# Patient Record
Sex: MALE | Race: WHITE | Employment: FULL TIME | URBAN - NONMETROPOLITAN AREA
[De-identification: names, ages, dates, MRNs, and addresses within clinical notes are randomized per-mention and may not be internally consistent; named-entity substitution may affect disease eponyms.]

---

## 2017-03-08 ENCOUNTER — OFFICE VISIT (OUTPATIENT)
Dept: CARDIOLOGY | Age: 62
End: 2017-03-08

## 2017-03-08 ENCOUNTER — PROCEDURE VISIT (OUTPATIENT)
Dept: CARDIOLOGY | Age: 62
End: 2017-03-08

## 2017-03-08 VITALS
SYSTOLIC BLOOD PRESSURE: 106 MMHG | WEIGHT: 184 LBS | DIASTOLIC BLOOD PRESSURE: 70 MMHG | HEART RATE: 72 BPM | BODY MASS INDEX: 28.88 KG/M2 | HEIGHT: 67 IN

## 2017-03-08 DIAGNOSIS — Z95.0 PACEMAKER: ICD-10-CM

## 2017-03-08 DIAGNOSIS — T82.110D PACEMAKER LEAD MALFUNCTION, SUBSEQUENT ENCOUNTER: ICD-10-CM

## 2017-03-08 DIAGNOSIS — Z95.0 PACEMAKER: Primary | ICD-10-CM

## 2017-03-08 DIAGNOSIS — Z45.010 PACEMAKER BATTERY DEPLETION: ICD-10-CM

## 2017-03-08 DIAGNOSIS — E78.5 DYSLIPIDEMIA: ICD-10-CM

## 2017-03-08 DIAGNOSIS — I10 ESSENTIAL HYPERTENSION: ICD-10-CM

## 2017-03-08 DIAGNOSIS — Z71.6 TOBACCO ABUSE COUNSELING: Primary | Chronic | ICD-10-CM

## 2017-03-08 DIAGNOSIS — T82.110D PACEMAKER LEAD FRACTURE, SUBSEQUENT ENCOUNTER: Chronic | ICD-10-CM

## 2017-03-08 DIAGNOSIS — Z72.0 TOBACCO ABUSE: ICD-10-CM

## 2017-03-08 PROCEDURE — 99213 OFFICE O/P EST LOW 20 MIN: CPT | Performed by: INTERNAL MEDICINE

## 2017-03-08 PROCEDURE — 93280 PM DEVICE PROGR EVAL DUAL: CPT | Performed by: INTERNAL MEDICINE

## 2017-03-08 ASSESSMENT — ENCOUNTER SYMPTOMS
RESPIRATORY NEGATIVE: 1
GASTROINTESTINAL NEGATIVE: 1
EYES NEGATIVE: 1

## 2017-06-15 ENCOUNTER — TELEPHONE (OUTPATIENT)
Dept: CARDIOLOGY | Age: 62
End: 2017-06-15

## 2017-06-16 ENCOUNTER — PROCEDURE VISIT (OUTPATIENT)
Dept: CARDIOLOGY | Age: 62
End: 2017-06-16

## 2017-06-16 DIAGNOSIS — Z95.0 PACEMAKER: Primary | ICD-10-CM

## 2017-06-16 PROCEDURE — 93296 REM INTERROG EVL PM/IDS: CPT | Performed by: INTERNAL MEDICINE

## 2017-06-16 PROCEDURE — 93294 REM INTERROG EVL PM/LDLS PM: CPT | Performed by: INTERNAL MEDICINE

## 2017-09-21 ENCOUNTER — TELEPHONE (OUTPATIENT)
Dept: CARDIOLOGY CLINIC | Age: 62
End: 2017-09-21

## 2017-09-27 ENCOUNTER — PROCEDURE VISIT (OUTPATIENT)
Dept: CARDIOLOGY CLINIC | Age: 62
End: 2017-09-27
Payer: COMMERCIAL

## 2017-09-27 DIAGNOSIS — Z95.0 PACEMAKER: Primary | ICD-10-CM

## 2017-09-27 PROCEDURE — 93294 REM INTERROG EVL PM/LDLS PM: CPT | Performed by: INTERNAL MEDICINE

## 2017-09-27 PROCEDURE — 93296 REM INTERROG EVL PM/IDS: CPT | Performed by: INTERNAL MEDICINE

## 2017-11-01 ENCOUNTER — OFFICE VISIT (OUTPATIENT)
Dept: CARDIOLOGY CLINIC | Age: 62
End: 2017-11-01
Payer: COMMERCIAL

## 2017-11-01 VITALS
DIASTOLIC BLOOD PRESSURE: 62 MMHG | WEIGHT: 187 LBS | SYSTOLIC BLOOD PRESSURE: 136 MMHG | HEART RATE: 70 BPM | HEIGHT: 69 IN | BODY MASS INDEX: 27.7 KG/M2

## 2017-11-01 DIAGNOSIS — E78.5 DYSLIPIDEMIA: ICD-10-CM

## 2017-11-01 DIAGNOSIS — Z45.010 PACEMAKER BATTERY DEPLETION: ICD-10-CM

## 2017-11-01 DIAGNOSIS — Z72.0 TOBACCO ABUSE: ICD-10-CM

## 2017-11-01 DIAGNOSIS — Z71.6 TOBACCO ABUSE COUNSELING: Chronic | ICD-10-CM

## 2017-11-01 DIAGNOSIS — Z95.0 PACEMAKER: ICD-10-CM

## 2017-11-01 DIAGNOSIS — Z82.49 FAMILY HISTORY OF EARLY CAD: Primary | ICD-10-CM

## 2017-11-01 PROCEDURE — 99213 OFFICE O/P EST LOW 20 MIN: CPT | Performed by: INTERNAL MEDICINE

## 2017-11-01 PROCEDURE — 93000 ELECTROCARDIOGRAM COMPLETE: CPT | Performed by: INTERNAL MEDICINE

## 2017-11-01 ASSESSMENT — ENCOUNTER SYMPTOMS
EYES NEGATIVE: 1
RESPIRATORY NEGATIVE: 1
GASTROINTESTINAL NEGATIVE: 1

## 2017-11-01 NOTE — PROGRESS NOTES
time. He appears well-developed and well-nourished. No distress. HENT:   Head: Normocephalic and atraumatic. Mouth/Throat: Oropharynx is clear and moist.   Eyes: Conjunctivae and EOM are normal. Pupils are equal, round, and reactive to light. No scleral icterus. Neck: Normal range of motion. Neck supple. No JVD present. No thyromegaly present. Cardiovascular: Normal rate, regular rhythm and normal heart sounds. Pulses:       Radial pulses are 2+ on the right side, and 2+ on the left side. Femoral pulses are 2+ on the right side, and 2+ on the left side. Popliteal pulses are 2+ on the right side, and 2+ on the left side. Dorsalis pedis pulses are 2+ on the right side, and 2+ on the left side. Posterior tibial pulses are 2+ on the right side, and 2+ on the left side. Pulmonary/Chest: Effort normal and breath sounds normal.   Abdominal: Soft. Bowel sounds are normal. He exhibits no mass. There is no tenderness. Musculoskeletal: He exhibits no edema. Lymphadenopathy:     He has no cervical adenopathy. Neurological: He is alert and oriented to person, place, and time. He has normal reflexes. No cranial nerve deficit. Skin: Skin is warm. No rash noted. Psychiatric: He has a normal mood and affect.        /62   Pulse 70   Ht 5' 9\" (1.753 m)   Wt 187 lb (84.8 kg)   BMI 27.62 kg/m²   Wt Readings from Last 3 Encounters:   11/01/17 187 lb (84.8 kg)   03/08/17 184 lb (83.5 kg)   07/06/16 181 lb (82.1 kg)     BP Readings from Last 3 Encounters:   11/01/17 136/62   03/08/17 106/70   07/06/16 118/84       Lab Data  CBC:   Lab Results   Component Value Date    WBC 6.9 11/18/2014    RBC 5.00 11/18/2014    RBC 5.18 03/15/2012    HGB 15.6 11/18/2014    HGB 15.0 09/27/2013    HCT 46.9 11/18/2014    MCV 93.8 11/18/2014    MCH 31.3 11/18/2014    MCHC 33.4 11/18/2014    RDW 13.3 11/18/2014     11/18/2014     09/27/2013     03/15/2012    MPV 10.3 11/18/2014 CMP:    Lab Results   Component Value Date     11/18/2014    K 4.9 11/18/2014     11/18/2014    CO2 24 11/18/2014    BUN 15 11/18/2014    CREATININE 0.7 11/18/2014    CREATININE 0.9 09/27/2013    CREATININE 0.9 03/15/2012    LABGLOM >90 11/18/2014    GLUCOSE 93 11/26/2014    GLUCOSE 99 03/15/2012    PROT 7.5 11/26/2014    LABALBU 4.6 11/26/2014    LABALBU 4.4 09/15/2011    CALCIUM 9.6 11/18/2014    BILITOT 0.3 11/26/2014    ALKPHOS 97 11/26/2014    AST 29 11/26/2014    ALT 41 11/26/2014     Hepatic Function Panel:    Lab Results   Component Value Date    ALKPHOS 97 11/26/2014    ALT 41 11/26/2014    AST 29 11/26/2014    PROT 7.5 11/26/2014    BILITOT 0.3 11/26/2014    BILIDIR <0.2 11/26/2014    LABALBU 4.6 11/26/2014    LABALBU 4.4 09/15/2011     Magnesium:    Lab Results   Component Value Date    MG 2.2 09/27/2013     PT/INR:  No results found for: PROTIME, INR  HgBA1c:  No results found for: LABA1C  FLP:    Lab Results   Component Value Date    TRIG 220 11/26/2014    TRIG 175 09/27/2013    TRIG 256 03/15/2012    HDL 41 11/26/2014    HDL 39 09/27/2013    HDL 39 03/15/2012    LDLCALC 120 11/26/2014    LDLCALC 115 09/27/2013    LDLCALC 131 03/15/2012     TSH:  No results found for: TSH  No results for input(s): CKTOTAL, CKMB, CKMBINDEX, TROPONINI in the last 72 hours. Assessment:     Mr. Loc Cordero is a 63 y.o. who is known to us with history of SSS, S/P pacemaker implantation. He is a smoker and has FH of CAD. He had his pulse generator replaced and had an new atrial lead inserted keeping the fractured atrial lead in place. This was done in December 2014. Patient is here for follow up on paroxysmal atrial fibrillation. has been doing fine with no reported no symptoms. Unfortunately continues to smoke. The following diagnoses were addressed during this visit:  1. Family history of early CAD  EKG 12 Lead   2. St Victorino dual pacemaker  EKG 12 Lead   3. [employer]. Victorino dual pacemaker     4.  Dyslipidemia

## 2017-11-01 NOTE — PROGRESS NOTES
Patient here for 6 month follow up. EKG done today. Patient denies SOB. Chest pain. Palpitations. Dizziness/lightheaded. Fatigue. FOSTER.

## 2017-11-29 ENCOUNTER — HOSPITAL ENCOUNTER (OUTPATIENT)
Age: 62
Discharge: HOME | End: 2017-11-29
Payer: COMMERCIAL

## 2017-11-29 DIAGNOSIS — E78.5 DYSLIPIDEMIA: ICD-10-CM

## 2017-11-29 LAB
CHOLESTEROL, TOTAL: 234 MG/DL (ref 100–199)
HDLC SERPL-MCNC: 38 MG/DL
LDL CHOLESTEROL CALCULATED: 116 MG/DL
TRIGL SERPL-MCNC: 401 MG/DL (ref 0–199)

## 2017-11-29 PROCEDURE — 80061 LIPID PANEL: CPT

## 2017-11-29 PROCEDURE — 36415 COLL VENOUS BLD VENIPUNCTURE: CPT

## 2018-01-04 ENCOUNTER — PROCEDURE VISIT (OUTPATIENT)
Dept: CARDIOLOGY CLINIC | Age: 63
End: 2018-01-04
Payer: COMMERCIAL

## 2018-01-04 DIAGNOSIS — Z95.0 PACEMAKER: Primary | ICD-10-CM

## 2018-01-04 PROCEDURE — 93294 REM INTERROG EVL PM/LDLS PM: CPT | Performed by: INTERNAL MEDICINE

## 2018-01-04 PROCEDURE — 93296 REM INTERROG EVL PM/IDS: CPT | Performed by: INTERNAL MEDICINE

## 2018-03-29 ENCOUNTER — NURSE ONLY (OUTPATIENT)
Dept: CARDIOLOGY CLINIC | Age: 63
End: 2018-03-29
Payer: COMMERCIAL

## 2018-03-29 DIAGNOSIS — Z95.0 PACEMAKER: Primary | ICD-10-CM

## 2018-03-29 PROCEDURE — 93280 PM DEVICE PROGR EVAL DUAL: CPT | Performed by: INTERNAL MEDICINE

## 2018-04-25 ENCOUNTER — OFFICE VISIT (OUTPATIENT)
Dept: CARDIOLOGY CLINIC | Age: 63
End: 2018-04-25
Payer: COMMERCIAL

## 2018-04-25 VITALS
BODY MASS INDEX: 27.13 KG/M2 | HEIGHT: 69 IN | HEART RATE: 76 BPM | SYSTOLIC BLOOD PRESSURE: 126 MMHG | WEIGHT: 183.2 LBS | DIASTOLIC BLOOD PRESSURE: 86 MMHG

## 2018-04-25 DIAGNOSIS — E78.5 DYSLIPIDEMIA: Primary | ICD-10-CM

## 2018-04-25 DIAGNOSIS — Z95.0 PACEMAKER: ICD-10-CM

## 2018-04-25 DIAGNOSIS — I10 ESSENTIAL HYPERTENSION: ICD-10-CM

## 2018-04-25 DIAGNOSIS — Z72.0 TOBACCO ABUSE: ICD-10-CM

## 2018-04-25 PROCEDURE — 99213 OFFICE O/P EST LOW 20 MIN: CPT | Performed by: PHYSICIAN ASSISTANT

## 2018-06-28 ENCOUNTER — PROCEDURE VISIT (OUTPATIENT)
Dept: CARDIOLOGY CLINIC | Age: 63
End: 2018-06-28
Payer: COMMERCIAL

## 2018-06-28 DIAGNOSIS — Z95.0 PACEMAKER: Primary | ICD-10-CM

## 2018-06-28 PROCEDURE — 93294 REM INTERROG EVL PM/LDLS PM: CPT | Performed by: INTERNAL MEDICINE

## 2018-06-28 PROCEDURE — 93296 REM INTERROG EVL PM/IDS: CPT | Performed by: INTERNAL MEDICINE

## 2018-10-05 ENCOUNTER — PROCEDURE VISIT (OUTPATIENT)
Dept: CARDIOLOGY CLINIC | Age: 63
End: 2018-10-05
Payer: COMMERCIAL

## 2018-10-05 DIAGNOSIS — Z95.0 PACEMAKER: Primary | ICD-10-CM

## 2018-10-05 PROCEDURE — 93296 REM INTERROG EVL PM/IDS: CPT | Performed by: INTERNAL MEDICINE

## 2018-10-05 PROCEDURE — 93294 REM INTERROG EVL PM/LDLS PM: CPT | Performed by: INTERNAL MEDICINE

## 2018-10-05 NOTE — PROGRESS NOTES
DR CRUZ PT  ST RIVAS DUAL PACEMAKER REMOTE  BATTERY 11.2-12.5 YRS REMAINING  ATRIAL IMPEDENCE 360  VENT IMPEDENCE 650  P WAVES 3.8  RV WAVES >12  DDD   A PACED 16%  V PACED <1%

## 2018-12-11 ENCOUNTER — OFFICE VISIT (OUTPATIENT)
Dept: CARDIOLOGY CLINIC | Age: 63
End: 2018-12-11
Payer: COMMERCIAL

## 2018-12-11 VITALS
WEIGHT: 183.8 LBS | HEIGHT: 70 IN | HEART RATE: 68 BPM | BODY MASS INDEX: 26.31 KG/M2 | SYSTOLIC BLOOD PRESSURE: 144 MMHG | DIASTOLIC BLOOD PRESSURE: 82 MMHG

## 2018-12-11 DIAGNOSIS — I10 ESSENTIAL HYPERTENSION: Primary | ICD-10-CM

## 2018-12-11 DIAGNOSIS — I49.9 IRREGULAR HEART BEAT: ICD-10-CM

## 2018-12-11 DIAGNOSIS — E78.5 DYSLIPIDEMIA: ICD-10-CM

## 2018-12-11 PROCEDURE — 93000 ELECTROCARDIOGRAM COMPLETE: CPT | Performed by: INTERNAL MEDICINE

## 2018-12-11 PROCEDURE — 99213 OFFICE O/P EST LOW 20 MIN: CPT | Performed by: INTERNAL MEDICINE

## 2018-12-11 NOTE — PROGRESS NOTES
All patientquestions answered. Pt voiced understanding. Patient agreed with treatment plan.                Electronically signed by Brennan Jaime MD FACAWILDA,JESUS,FSYESICA,FSCMARY,СЕРГЕЙ,NBA,FACP.  12/11/2018at 3:49 PM

## 2019-01-10 ENCOUNTER — PROCEDURE VISIT (OUTPATIENT)
Dept: CARDIOLOGY CLINIC | Age: 64
End: 2019-01-10
Payer: COMMERCIAL

## 2019-01-10 DIAGNOSIS — Z95.0 PACEMAKER: Primary | ICD-10-CM

## 2019-01-10 PROCEDURE — 93296 REM INTERROG EVL PM/IDS: CPT | Performed by: INTERNAL MEDICINE

## 2019-01-10 PROCEDURE — 93294 REM INTERROG EVL PM/LDLS PM: CPT | Performed by: INTERNAL MEDICINE

## 2019-04-04 ENCOUNTER — NURSE ONLY (OUTPATIENT)
Dept: CARDIOLOGY CLINIC | Age: 64
End: 2019-04-04
Payer: COMMERCIAL

## 2019-04-04 DIAGNOSIS — Z95.0 PACEMAKER: Primary | ICD-10-CM

## 2019-04-04 PROCEDURE — 93280 PM DEVICE PROGR EVAL DUAL: CPT | Performed by: INTERNAL MEDICINE

## 2019-04-04 NOTE — PROGRESS NOTES
St Victorino dual pacemaker  Battery 11.3-12. 5years  A paced 15%  V Paced <1%  p wave 3.5mV  r wave >12mV  Atrial threshold 0.5V @ 0.4ms  Ventricle threshold 1.12V @ 0.4ms  Atrial impedance 350 ohms  Ventricle impedance 680ms  checo gross noted, Ayah Chapin RN reviewed this and not able to make changes due to size of p wave

## 2019-06-07 ENCOUNTER — OFFICE VISIT (OUTPATIENT)
Dept: CARDIOLOGY CLINIC | Age: 64
End: 2019-06-07
Payer: COMMERCIAL

## 2019-06-07 VITALS
HEIGHT: 69 IN | SYSTOLIC BLOOD PRESSURE: 120 MMHG | WEIGHT: 187 LBS | BODY MASS INDEX: 27.7 KG/M2 | DIASTOLIC BLOOD PRESSURE: 78 MMHG | HEART RATE: 87 BPM

## 2019-06-07 DIAGNOSIS — Z95.0 PACEMAKER: Primary | ICD-10-CM

## 2019-06-07 DIAGNOSIS — I10 ESSENTIAL HYPERTENSION: ICD-10-CM

## 2019-06-07 PROCEDURE — 99213 OFFICE O/P EST LOW 20 MIN: CPT | Performed by: NUCLEAR MEDICINE

## 2019-06-07 NOTE — PROGRESS NOTES
[address] [facility] CARDIOLOGY  [address]  [address] [place] [address] [address] [address]  Dept: [phone]  Dept Fax: [phone]  Loc: [phone]    Visit Date: 6/7/2019    Biju Lopez is a 66 y.o. male who presents todayfor:  Chief Complaint   Patient presents with    6 Month Follow-Up    Hypertension    Irregular Heart Beat   used to see Darwin Aggarwal before  MELISSA Schuler since 1996   ?/ previous A fib   He is not sure   Higher BP today   He doesn't check it   No chest pain  No changes in breathing  No meds        HPI:  HPI  Past Medical History:   Diagnosis Date    Dizziness - light-headed     Dyslipidemia     Fainting     Family history of early CAD 3/20/2013    Hypertension 10/2/2013    Irregular heart beat     Pacemaker     St. Victorino dual pacer    Pacemaker battery depletion: 12/2/2014 generator replacement along with insertion of a new atrial lead 12/21/2014    Pacemaker lead malfunction: Some noise on cecille atrial lead causing mode switch. 10/2/2013    St Victorino dual pacemaker 6/3/2016    Tobacco abuse counseling 10/2/2013    Vasovagal syncope       Past Surgical History:   Procedure Laterality Date    CARDIOVASCULAR STRESS TEST  3-23-11    Patient had adequate exercise stress test by heart rate criteria and showed good functional capacity. Absence of chest pain or SVT changes suggestive of ischemia at this workload. Absence of exercise induced arrhythmias. EF 50%.  TRANSTHORACIC ECHOCARDIOGRAM  3-23-11    LV size and systolic function normal. EF 50%. Grade 1 diastolic dysfunction. AV trileaflet. Leaflets exhibited normal thickness and normal cuspal seperation.       Family History   Problem Relation Age of Onset    Coronary Art Dis Mother     Cancer Father      Social History     Tobacco Use    Smoking status: Current Every Day Smoker     Packs/day: 0.50     Years: 35.00     Pack years: 17.50     Types: Cigarettes    Smokeless tobacco: Never Used   Substance Use Topics    Alcohol use: Yes     Alcohol/week: 3.6 oz     Types: 6 Cans of beer per week      Current Outpatient Medications   Medication Sig Dispense Refill    aspirin 81 MG EC tablet Take 81 mg by mouth 2 times daily        No current facility-administered medications for this visit. Allergies   Allergen Reactions    Codeine Anxiety and Rash     Health Maintenance   Topic Date Due    Hepatitis C screen  1955    Pneumococcal 0-64 years Vaccine (1 of 1 - PPSV23) 07/22/1963    HIV screen  07/22/1972    DTaP/Tdap/Td vaccine (1 - Tdap) 07/22/1976    Diabetes screen  07/22/1997    Shingles Vaccine (1 of 2) 07/22/2007    Colon cancer screen colonoscopy  07/22/2007    Flu vaccine (Season Ended) 09/01/2019    Lipid screen  11/29/2022       Subjective:  Review of Systems  General:   No fever, no chills, No fatigue or weight loss  Pulmonary:    some dyspnea, no wheezing  Cardiac:    Denies recent chest pain,   GI:     No nausea or vomiting, no abdominal pain  Neuro:    No dizziness or light headedness,   Musculoskeletal:  No recent active issues  Extremities:   No edema, good peripheral pulses      Objective:  Physical Exam  BP (!) 140/93   Pulse 87   Ht 5' 9\" (1.753 m)   Wt 187 lb (84.8 kg)   BMI 27.62 kg/m²   General:   Well developed, well nourished  Lungs:   Clear to auscultation  Heart:    Normal S1 S2, Slight murmur. no rubs, no gallops  Abdomen:   Soft, non tender, no organomegalies, positive bowel sounds  Extremities:   No edema, no cyanosis, good peripheral pulses  Neurological:   Awake, alert, oriented. No obvious focal deficits  Musculoskelatal:  No obvious deformities    Assessment:      Diagnosis Orders   1. Pacemaker     2. Essential hypertension     cardiac fair for now       Plan:  No follow-ups on file. As above  Continue risk factor modification and medical management  Thank you for allowing me to participate in the care of your patient.  Please don't hesitate to contact me regarding any further issues related to the patient care    Orders Placed:  No orders of the defined types were placed in this encounter. Medications Prescribed:  No orders of the defined types were placed in this encounter. Discussed use, benefit, and side effects of prescribed medications. All patient questions answered. Pt voicedunderstanding. Instructed to continue current medications, diet and exercise. Continue risk factor modification and medical management. Patient agreed with treatment plan. Follow up as directed.     Electronically signedby Aric Ha MD on 6/7/2019 at 9:40 AM

## 2019-07-11 ENCOUNTER — PROCEDURE VISIT (OUTPATIENT)
Dept: CARDIOLOGY CLINIC | Age: 64
End: 2019-07-11
Payer: COMMERCIAL

## 2019-07-11 DIAGNOSIS — Z95.0 PACEMAKER: Primary | ICD-10-CM

## 2019-07-11 PROCEDURE — 93294 REM INTERROG EVL PM/LDLS PM: CPT | Performed by: INTERNAL MEDICINE

## 2019-07-11 PROCEDURE — 93296 REM INTERROG EVL PM/IDS: CPT | Performed by: INTERNAL MEDICINE

## 2019-10-18 ENCOUNTER — PROCEDURE VISIT (OUTPATIENT)
Dept: CARDIOLOGY CLINIC | Age: 64
End: 2019-10-18
Payer: COMMERCIAL

## 2019-10-18 DIAGNOSIS — Z95.0 PACEMAKER: Primary | ICD-10-CM

## 2019-10-18 PROCEDURE — 93296 REM INTERROG EVL PM/IDS: CPT | Performed by: INTERNAL MEDICINE

## 2019-10-18 PROCEDURE — 93294 REM INTERROG EVL PM/LDLS PM: CPT | Performed by: INTERNAL MEDICINE

## 2020-01-24 ENCOUNTER — PROCEDURE VISIT (OUTPATIENT)
Dept: CARDIOLOGY CLINIC | Age: 65
End: 2020-01-24
Payer: COMMERCIAL

## 2020-01-24 PROCEDURE — 93296 REM INTERROG EVL PM/IDS: CPT | Performed by: INTERNAL MEDICINE

## 2020-01-24 PROCEDURE — 93294 REM INTERROG EVL PM/LDLS PM: CPT | Performed by: INTERNAL MEDICINE

## 2020-01-24 NOTE — PROGRESS NOTES
DR Rich Scott PT  [facility] RIVAS DUAL PACEMAKER REMOTE   BATTERY 11-12.4 YRS REMAINING  ATRIAL IMPEDENCE 330  VENT IMPEDENCE 690  P WAVES 3.5  RV WAVES >12  ATRIAL THRESHOLD 0.5 @ 0.4  PER THE DEVICE  VENT THRESHOLD 1.25 @ 0.4 PER THE DEVICE  DDD   A PACED 16%  V PACED <1%    ATRIAL AND VENT AMPLITUDES PROGRAMMED AT AUTO

## 2020-04-02 ENCOUNTER — TELEPHONE (OUTPATIENT)
Dept: CARDIOLOGY CLINIC | Age: 65
End: 2020-04-02

## 2020-04-02 NOTE — TELEPHONE ENCOUNTER
LMOM     Asking for a return call.      Patient is scheduled for a device check apt in office on 4.8.2020-  This has been converted to a [employer],    Next in office check is scheduled for 07.01.2020 at 9:30am  new schedule made

## 2020-04-09 ENCOUNTER — PROCEDURE VISIT (OUTPATIENT)
Dept: CARDIOLOGY CLINIC | Age: 65
End: 2020-04-09

## 2020-07-01 ENCOUNTER — NURSE ONLY (OUTPATIENT)
Dept: CARDIOLOGY CLINIC | Age: 65
End: 2020-07-01
Payer: COMMERCIAL

## 2020-07-01 PROCEDURE — 93280 PM DEVICE PROGR EVAL DUAL: CPT | Performed by: NUCLEAR MEDICINE

## 2020-07-01 NOTE — PROGRESS NOTES
St curtis dual pacer     . Wang Salcido Battery longevity:  10.2-11.7  Presenting AS VS     Atrial impedance 300  RV impedance 650    P wave sensing 2.6  R wave sensing 12    15 % atrial paced  0 % RV paced     Atrial threshold 0.5 V  at 0.4ms  RV threshold 1.875 V at 0.4ms  Mode switches   0

## 2020-07-30 ENCOUNTER — OFFICE VISIT (OUTPATIENT)
Dept: CARDIOLOGY CLINIC | Age: 65
End: 2020-07-30
Payer: COMMERCIAL

## 2020-07-30 VITALS
HEART RATE: 79 BPM | BODY MASS INDEX: 27.4 KG/M2 | WEIGHT: 185 LBS | SYSTOLIC BLOOD PRESSURE: 122 MMHG | DIASTOLIC BLOOD PRESSURE: 70 MMHG | HEIGHT: 69 IN

## 2020-07-30 PROCEDURE — 99213 OFFICE O/P EST LOW 20 MIN: CPT | Performed by: NUCLEAR MEDICINE

## 2020-07-30 PROCEDURE — 93000 ELECTROCARDIOGRAM COMPLETE: CPT | Performed by: NUCLEAR MEDICINE

## 2020-07-30 NOTE — PROGRESS NOTES
[address] [facility],[place] [address] [address]  [facility] [address]  Dept: [phone]  Dept Fax: [phone]  Loc: [phone]    Visit Date: 7/30/2020    Beverley Wall is a 63 y.o. male who presents todayfor:  Chief Complaint   Patient presents with    Check-Up     pacer      Know pacer since 1996  No recent arrhythmia  No chest pain   No changes in breathing  No new symptoms  BP is stable  No dizziness  No syncope      HPI:  HPI  Past Medical History:   Diagnosis Date    Dizziness - light-headed     Dyslipidemia     Fainting     Family history of early CAD 3/20/2013    Hypertension 10/2/2013    Irregular heart beat     Pacemaker     St. Victorino dual pacer    Pacemaker battery depletion: 12/2/2014 generator replacement along with insertion of a new atrial lead 12/21/2014    Pacemaker lead malfunction: Some noise on cecille atrial lead causing mode switch. 10/2/2013    St Victorino dual pacemaker 6/3/2016    Tobacco abuse counseling 10/2/2013    Vasovagal syncope       Past Surgical History:   Procedure Laterality Date    CARDIOVASCULAR STRESS TEST  3-23-11    Patient had adequate exercise stress test by heart rate criteria and showed good functional capacity. Absence of chest pain or SVT changes suggestive of ischemia at this workload. Absence of exercise induced arrhythmias. EF 50%.  TRANSTHORACIC ECHOCARDIOGRAM  3-23-11    LV size and systolic function normal. EF 50%. Grade 1 diastolic dysfunction. AV trileaflet. Leaflets exhibited normal thickness and normal cuspal seperation. Family History   Problem Relation Age of Onset    Coronary Art Dis Mother     Cancer Father      Social History     Tobacco Use    Smoking status: Current Every Day Smoker     Packs/day: 0.50     Years: 35.00     Pack years: 17.50     Types: Cigarettes    Smokeless tobacco: Never Used   Substance Use Topics    Alcohol use:  Yes     Alcohol/week: 6.0 standard drinks     Types: modification and medical management  Thank you for allowing me to participate in the care of your patient. Please don't hesitate to contact me regarding any further issues related to the patient care    Orders Placed:  Orders Placed This Encounter   Procedures    EKG 12 Lead     Order Specific Question:   Reason for Exam?     Answer: Other       Medications Prescribed:  No orders of the defined types were placed in this encounter. Discussed use, benefit, and side effects of prescribed medications. All patient questions answered. Pt voicedunderstanding. Instructed to continue current medications, diet and exercise. Continue risk factor modification and medical management. Patient agreed with treatment plan. Follow up as directed.     Electronically signedby Dania Garcias MD on 7/30/2020 at 12:08 PM

## 2020-10-27 ENCOUNTER — PROCEDURE VISIT (OUTPATIENT)
Dept: CARDIOLOGY CLINIC | Age: 65
End: 2020-10-27
Payer: COMMERCIAL

## 2020-10-27 PROCEDURE — 93296 REM INTERROG EVL PM/IDS: CPT | Performed by: NUCLEAR MEDICINE

## 2020-10-27 PROCEDURE — 93294 REM INTERROG EVL PM/LDLS PM: CPT | Performed by: NUCLEAR MEDICINE

## 2020-10-27 NOTE — PROGRESS NOTES
St Victorino dual pacemaker  Battery 10.3-11. 7Yrs  A paced 17%  V paced <1%  p wave 3.2mV  r wave >12.0mV  Atrial threshold 0.5V @ 0.4ms  Ventricle threshold [place]  In July was 1.875V @ 0.4ms  A impedance 290 ohms  V impedance 660 ohms  Episode of NS VT 7 beats then 9 beats

## 2020-11-03 PROBLEM — R55 FAINTING: Status: RESOLVED | Noted: 2020-11-03 | Resolved: 2020-11-03

## 2021-02-01 ENCOUNTER — PROCEDURE VISIT (OUTPATIENT)
Dept: CARDIOLOGY CLINIC | Age: 66
End: 2021-02-01
Payer: COMMERCIAL

## 2021-02-01 DIAGNOSIS — Z95.0 PACEMAKER: Primary | ICD-10-CM

## 2021-02-01 PROCEDURE — 93296 REM INTERROG EVL PM/IDS: CPT | Performed by: NUCLEAR MEDICINE

## 2021-02-01 PROCEDURE — 93294 REM INTERROG EVL PM/LDLS PM: CPT | Performed by: NUCLEAR MEDICINE

## 2021-02-01 NOTE — PROGRESS NOTES
[facility] st curtis dual pacer     . Abdias Kramer Battery longevity:  10.2-11.8 years on device   Presenting rhythm  AS VS     Atrial impedance 290  RV impedance 700    P wave sensing 3.1  R wave sensing 12    14 % atrial paced  0 % RV paced     Atrial threshold 0.5 V  at 0.4ms  RV threshold 1.125 V at 0.4ms  Mode switches   0

## 2021-05-07 ENCOUNTER — PROCEDURE VISIT (OUTPATIENT)
Dept: CARDIOLOGY CLINIC | Age: 66
End: 2021-05-07
Payer: COMMERCIAL

## 2021-05-07 DIAGNOSIS — Z95.0 PACEMAKER: Primary | ICD-10-CM

## 2021-05-07 PROCEDURE — 93296 REM INTERROG EVL PM/IDS: CPT | Performed by: NUCLEAR MEDICINE

## 2021-05-07 PROCEDURE — 93294 REM INTERROG EVL PM/LDLS PM: CPT | Performed by: NUCLEAR MEDICINE

## 2021-05-07 NOTE — PROGRESS NOTES
DR Navjot Sykes PT  [facility] RIVAS DUAL PACEMAKER REMTOE   BATTERY 10.2-11.8 YRS REMAINING  ATRIAL IMPEDENCE 290  VENT IMPEDENCE 690  P WAVES 2.9  RV WAVES >12  ATRIAL THRESHOLD PER THE DEVICE 0.5 @ 0.4  VENT THRESHOLD PER THE DEVICE 1.25 @ 0.4  ATRIAL AND VENT AMPLITUDES AUTO   DDD   A PACED 14%  V PACED <1%  NO EPISODES

## 2021-06-28 ENCOUNTER — TELEPHONE (OUTPATIENT)
Dept: CARDIOLOGY CLINIC | Age: 66
End: 2021-06-28

## 2021-06-28 NOTE — TELEPHONE ENCOUNTER
Patient LM on nurse line stating he needs to RS appt on 6/30/2021 with Dr. Madelyn Noel. I LM for patient to call our office back.

## 2021-08-19 NOTE — PROGRESS NOTES
tobacco: Never Used   Substance and Sexual Activity    Alcohol use: Yes     Alcohol/week: 6.0 standard drinks     Types: 6 Cans of beer per week    Drug use: No    Sexual activity: Not on file   Other Topics Concern    Not on file   Social History Narrative    Not on file     Social Determinants of Health     Financial Resource Strain:     Difficulty of Paying Living Expenses:    Food Insecurity:     Worried About Running Out of Food in the Last Year:     [address] in the Last Year:    Transportation Needs:     Lack of Transportation (Medical):  Lack of Transportation (Non-Medical):    Physical Activity:     Days of Exercise per Week:     Minutes of Exercise per Session:    Stress:     Feeling of Stress :    Social Connections:     Frequency of Communication with Friends and Family:     Frequency of Social Gatherings with Friends and Family:     Attends Hinduism Services:     Active Member of Clubs or Organizations:     Attends Club or Organization Meetings:     Marital Status:    Intimate Partner Violence:     Fear of Current or Ex-Partner:     Emotionally Abused:     Physically Abused:     Sexually Abused:        Family History   Problem Relation Age of Onset    Coronary Art Dis Mother     Cancer Father        There were no vitals taken for this visit. General:   Well developed, well nourished  Lungs:   Clear to auscultation  Heart:    Normal S1 S2, No murmur, rubs, or gallops  Abdomen:   Soft, non tender, no organomegalies, positive bowel sounds  Extremities:   No edema, no cyanosis, good peripheral pulses  Neurological:   Awake, alert, oriented. No obvious focal deficits  Musculoskeletal:  No obvious deformities    EKG:          Diagnosis Orders   1. St. Victorino dual pacemaker         No orders of the defined types were placed in this encounter.       Assessment/Plan:     Disposition:     Continue Dr ***'s current treatment plan  Follow up with Dr Lizbeth Lamb as scheduled or sooner if needed

## 2021-08-25 NOTE — PROGRESS NOTES
[facility] PROFESSIONAL SERVICES  [facility]   [address] [facility] [address]   Dept: [phone]   Dept Fax: [phone]   Loc: [phone]      Chief Complaint   Patient presents with    1 Year Follow Up     One year ov f/u in 60 y/o male with history of HLD, HTN, PPM following with pacer clinic, currently smoking 1/2 pack per day . Denies chest pain, palpitations, sob, FOSTER, lightheadedness, dizziness or syncope. Has had// some sob during exertion but only during this weeks higher temps and humidity, but normally no issues. Cardiologist:  Dr. Rock Blum:   No fever, no chills, No fatigue or weight loss  Pulmonary:    No dyspnea, no wheezing  Cardiac:    Denies recent chest pain   GI:     No nausea or vomiting, no abdominal pain  Neuro:    No dizziness or light headedness  Musculoskeletal:  No recent active issues  Extremities:   No edema, good peripheral pulses      Past Medical History:   Diagnosis Date    Dizziness - light-headed     Dyslipidemia     Fainting     Family history of early CAD 3/20/2013    Hypertension 10/2/2013    Irregular heart beat     Pacemaker     St. Victorino dual pacer    Pacemaker battery depletion: 12/2/2014 generator replacement along with insertion of a new atrial lead 12/21/2014    Pacemaker lead malfunction: Some noise on cecille atrial lead causing mode switch. 10/2/2013    St Victorino dual pacemaker 6/3/2016    Tobacco abuse counseling 10/2/2013    Vasovagal syncope        Allergies   Allergen Reactions    Codeine Anxiety and Rash       Current Outpatient Medications   Medication Sig Dispense Refill    aspirin 81 MG EC tablet Take 81 mg by mouth 2 times daily Takes 3 tabs daily       No current facility-administered medications for this visit.        Social History     Socioeconomic History    Marital status:      Spouse name: None    Number of children: None    Years of education: None    Highest education degree AV block, no acute abnormalities       Diagnosis Orders   1. St. Victorino dual pacemaker  EKG 12 lead   2. Irregular heart beat  EKG 12 lead   3. Essential hypertension     4. Dyslipidemia  Lipid Panel    Hepatic Function Panel   5. Tobacco abuse         Orders Placed This Encounter   Procedures    Lipid Panel     Standing Status:   Future     Standing Expiration Date:   8/26/2022     Order Specific Question:   Is Patient Fasting?/# of Hours     Answer:   15    Hepatic Function Panel     Standing Status:   Future     Standing Expiration Date:   8/26/2022    EKG 12 lead     Order Specific Question:   Reason for Exam?     Answer: Other   One year ov f/u   HLD  HTN: controlled  PPM following with pacer clinic  Currently smoking 1/2 pack cigarettes per day  1-2 beers nigtly    Denies chest pain, palpitations, sob, FOSTER, lightheadedness, dizziness or syncope. Has had some sob during exertion but only during this weeks higher temps and humidity, but normally no issues. Currently only asa     Needs repeat lipid, hepatic panel- last on record 2017    At least 5 min was spent discussing and encouraging smoking cessation to decrease adverse associated health risks including but not limited to heart disease, hypertension, peripheral vascular disease, lung disease, heart attack, stroke, cancer, loss of limb or death. Discussed use, benefit, and side effects of prescribed medications. Reports compliance and denies side effects with. All patient questions answered. Pt voiced understanding. Instructed to continue current medications, diet and exercise. Continue risk factor modification and medical management. Patient agreed with treatment plan. Follow up as directed.     Continue Dr Caryle Becker current treatment plan  Follow up with Dr Shon Nicole as scheduled or sooner if needed

## 2021-08-26 ENCOUNTER — NURSE ONLY (OUTPATIENT)
Dept: CARDIOLOGY CLINIC | Age: 66
End: 2021-08-26
Payer: COMMERCIAL

## 2021-08-26 ENCOUNTER — OFFICE VISIT (OUTPATIENT)
Dept: CARDIOLOGY CLINIC | Age: 66
End: 2021-08-26
Payer: COMMERCIAL

## 2021-08-26 VITALS
HEIGHT: 69 IN | SYSTOLIC BLOOD PRESSURE: 122 MMHG | DIASTOLIC BLOOD PRESSURE: 80 MMHG | HEART RATE: 92 BPM | WEIGHT: 182.6 LBS | BODY MASS INDEX: 27.05 KG/M2

## 2021-08-26 DIAGNOSIS — I10 ESSENTIAL HYPERTENSION: ICD-10-CM

## 2021-08-26 DIAGNOSIS — E78.5 DYSLIPIDEMIA: ICD-10-CM

## 2021-08-26 DIAGNOSIS — Z95.0 PACEMAKER: Primary | ICD-10-CM

## 2021-08-26 DIAGNOSIS — Z72.0 TOBACCO ABUSE: ICD-10-CM

## 2021-08-26 DIAGNOSIS — Z95.0 PACEMAKER: ICD-10-CM

## 2021-08-26 DIAGNOSIS — I49.9 IRREGULAR HEART BEAT: ICD-10-CM

## 2021-08-26 PROCEDURE — 93000 ELECTROCARDIOGRAM COMPLETE: CPT | Performed by: NURSE PRACTITIONER

## 2021-08-26 PROCEDURE — 99406 BEHAV CHNG SMOKING 3-10 MIN: CPT | Performed by: NURSE PRACTITIONER

## 2021-08-26 PROCEDURE — 93280 PM DEVICE PROGR EVAL DUAL: CPT | Performed by: NUCLEAR MEDICINE

## 2021-08-26 PROCEDURE — 99214 OFFICE O/P EST MOD 30 MIN: CPT | Performed by: NURSE PRACTITIONER

## 2021-08-26 NOTE — PROGRESS NOTES
In Office St Victorino Dual Pacemaker --with Merlin at home  Patient of Baki    Battery 9.5-10.9 years    Presenting rhythm AP VS 90  Underlying AS VS 58    A Impedance 310  RV Impedance 700    P wave sensing 2.9  R wave sensing >12    A Threshold 0.5 @ 0.40  RV Thresholds 1.625 @ 0.40      A Paced 14%  V Paced <1%    Programmed Mode DDD     No mode switches     Episodes none

## 2021-08-26 NOTE — PROGRESS NOTES
Patient denies having any chest pain, dizziness, lightheadedness or palpitations. Patient is having some SOB.

## 2021-08-30 ENCOUNTER — HOSPITAL ENCOUNTER (OUTPATIENT)
Age: 66
Discharge: HOME | End: 2021-08-30
Payer: COMMERCIAL

## 2021-08-30 DIAGNOSIS — E78.5 DYSLIPIDEMIA: ICD-10-CM

## 2021-08-30 LAB
ALBUMIN SERPL-MCNC: 4.3 G/DL (ref 3.5–5.1)
ALP BLD-CCNC: 92 U/L (ref 38–126)
ALT SERPL-CCNC: 44 U/L (ref 11–66)
AST SERPL-CCNC: 29 U/L (ref 5–40)
BILIRUB SERPL-MCNC: 0.4 MG/DL (ref 0.3–1.2)
BILIRUBIN DIRECT: < 0.2 MG/DL (ref 0–0.3)
CHOLESTEROL, TOTAL: 207 MG/DL (ref 100–199)
HDLC SERPL-MCNC: 45 MG/DL
LDL CHOLESTEROL CALCULATED: 105 MG/DL
TOTAL PROTEIN: 6.7 G/DL (ref 6.1–8)
TRIGL SERPL-MCNC: 285 MG/DL (ref 0–199)

## 2021-08-30 PROCEDURE — 36415 COLL VENOUS BLD VENIPUNCTURE: CPT

## 2021-08-30 PROCEDURE — 80061 LIPID PANEL: CPT

## 2021-08-30 PROCEDURE — 80076 HEPATIC FUNCTION PANEL: CPT

## 2021-12-07 ENCOUNTER — PROCEDURE VISIT (OUTPATIENT)
Dept: CARDIOLOGY CLINIC | Age: 66
End: 2021-12-07
Payer: COMMERCIAL

## 2021-12-07 DIAGNOSIS — Z95.0 PACEMAKER: Primary | ICD-10-CM

## 2021-12-07 PROCEDURE — 93294 REM INTERROG EVL PM/LDLS PM: CPT | Performed by: NUCLEAR MEDICINE

## 2021-12-07 PROCEDURE — 93296 REM INTERROG EVL PM/IDS: CPT | Performed by: NUCLEAR MEDICINE

## 2021-12-07 NOTE — PROGRESS NOTES
DR Real Fearing PT  [facility] RIVAS DUAL PACEMAKER REMOTE  BATTERY 9.6-10.9 YRS REMAINING    ATRIAL IMPEDENCE 310  VENT IMPEDENCE 680    P WAVES 3  RV WAVES >12    ATRIAL THRESHOLD 0.5 @ 0.4  VENT THRESHOLD 1.25 @ 0.4    ATRIAL AND VENT AMPLITUDES AUTO     DDD     A PACED 16%  V PACED <1%

## 2022-03-09 ENCOUNTER — PROCEDURE VISIT (OUTPATIENT)
Dept: CARDIOLOGY CLINIC | Age: 67
End: 2022-03-09
Payer: COMMERCIAL

## 2022-03-09 DIAGNOSIS — Z95.0 PACEMAKER: Primary | ICD-10-CM

## 2022-03-09 PROCEDURE — 93294 REM INTERROG EVL PM/LDLS PM: CPT | Performed by: NUCLEAR MEDICINE

## 2022-03-09 PROCEDURE — 93296 REM INTERROG EVL PM/IDS: CPT | Performed by: NUCLEAR MEDICINE

## 2022-03-09 NOTE — PROGRESS NOTES
DR Zina Rosario PT   [facility] ST RIVAS DUAL PACEMAKER REMOTE     BATTERY 8.5-9.7 YRS REMAINING  ATRIAL IMPEDENCE 300  VENT IMPEDENCE 680    P WAVES 3.5  RV WAVES >12    ATRIAL THRESHOLD 0.5 @ 0.4  VENT THRESHOLD 1.25 @ 0.4    ATRIAL AND VENT AMPLITUDES AUTO     DDD     A PACED 22%  V PACED <1%

## 2022-06-21 ENCOUNTER — PROCEDURE VISIT (OUTPATIENT)
Dept: CARDIOLOGY CLINIC | Age: 67
End: 2022-06-21
Payer: COMMERCIAL

## 2022-06-21 DIAGNOSIS — Z95.0 PACEMAKER: Primary | ICD-10-CM

## 2022-06-21 PROCEDURE — 93296 REM INTERROG EVL PM/IDS: CPT | Performed by: NUCLEAR MEDICINE

## 2022-06-21 PROCEDURE — 93294 REM INTERROG EVL PM/LDLS PM: CPT | Performed by: NUCLEAR MEDICINE

## 2022-06-21 NOTE — PROGRESS NOTES
Dr Mervat Ruiz pt  Merlin st Lenor Rayo dual pacer remote   Battery 2.6-2.9 yrs remaining    p waves 3  rv waves >12    Atrial impedence 290  Vent impedence 690    Atrial threshold 0.625 @ 0.4  Vent threshold 1.375 @ 0.4    Atrial and vent amplitudes auto     ddd     A paced 22%  V paced <1%

## 2022-08-26 ENCOUNTER — OFFICE VISIT (OUTPATIENT)
Dept: CARDIOLOGY CLINIC | Age: 67
End: 2022-08-26
Payer: MEDICARE

## 2022-08-26 VITALS
BODY MASS INDEX: 28.58 KG/M2 | SYSTOLIC BLOOD PRESSURE: 143 MMHG | DIASTOLIC BLOOD PRESSURE: 88 MMHG | HEIGHT: 69 IN | WEIGHT: 193 LBS | HEART RATE: 91 BPM

## 2022-08-26 DIAGNOSIS — I10 PRIMARY HYPERTENSION: Primary | ICD-10-CM

## 2022-08-26 PROCEDURE — 1123F ACP DISCUSS/DSCN MKR DOCD: CPT | Performed by: NUCLEAR MEDICINE

## 2022-08-26 PROCEDURE — 99214 OFFICE O/P EST MOD 30 MIN: CPT | Performed by: NUCLEAR MEDICINE

## 2022-08-26 PROCEDURE — 93000 ELECTROCARDIOGRAM COMPLETE: CPT | Performed by: NUCLEAR MEDICINE

## 2022-08-26 PROCEDURE — 4004F PT TOBACCO SCREEN RCVD TLK: CPT | Performed by: NUCLEAR MEDICINE

## 2022-08-26 PROCEDURE — 3017F COLORECTAL CA SCREEN DOC REV: CPT | Performed by: NUCLEAR MEDICINE

## 2022-08-26 PROCEDURE — G8417 CALC BMI ABV UP PARAM F/U: HCPCS | Performed by: NUCLEAR MEDICINE

## 2022-08-26 PROCEDURE — G8427 DOCREV CUR MEDS BY ELIG CLIN: HCPCS | Performed by: NUCLEAR MEDICINE

## 2022-08-26 NOTE — PROGRESS NOTES
[address] [facility] [address]  [facility] [address]  Dept: [phone]  Dept Fax: [phone]  Loc: [phone]    Visit Date: 8/26/2022    Jill Talbot is a 74 y.o. male who presents todayfor:  Chief Complaint   Patient presents with    1 Year Follow Up    Hypertension     Higher BP today   Not followed all the time  Known pacer  No chest pain   No changes in breathing  Some LLQ pain   Still smoking   Possible COPD   No dizziness  No syncope      HPI:  HPI  Past Medical History:   Diagnosis Date    Dizziness - light-headed     Dyslipidemia     Fainting     Family history of early CAD 3/20/2013    Hypertension 10/2/2013    Irregular heart beat     Pacemaker     [employer] dual pacer    Pacemaker battery depletion: 12/2/2014 generator replacement along with insertion of a new atrial lead 12/21/2014    Pacemaker lead malfunction: Some noise on cecille atrial lead causing mode switch. 10/2/2013    [employer] dual pacemaker 6/3/2016    Tobacco abuse counseling 10/2/2013    Vasovagal syncope       Past Surgical History:   Procedure Laterality Date    CARDIOVASCULAR STRESS TEST  3-23-11    Patient had adequate exercise stress test by heart rate criteria and showed good functional capacity. Absence of chest pain or SVT changes suggestive of ischemia at this workload. Absence of exercise induced arrhythmias. EF 50%. TRANSTHORACIC ECHOCARDIOGRAM  3-23-11    LV size and systolic function normal. EF 50%. Grade 1 diastolic dysfunction. AV trileaflet. Leaflets exhibited normal thickness and normal cuspal seperation. Family History   Problem Relation Age of Onset    Coronary Art Dis Mother     Cancer Father      Social History     Tobacco Use    Smoking status: Every Day     Packs/day: 0.50     Years: 35.00     Pack years: 17.50     Types: Cigarettes    Smokeless tobacco: Never   Substance Use Topics    Alcohol use:  Yes     Alcohol/week: 6.0 standard drinks Types: 6 Cans of beer per week      Current Outpatient Medications   Medication Sig Dispense Refill    aspirin 81 MG EC tablet Take 81 mg by mouth 2 times daily       No current facility-administered medications for this visit. Allergies   Allergen Reactions    Codeine Anxiety and Rash     Health Maintenance   Topic Date Due    COVID-19 Vaccine (1) Never done    Pneumococcal 65+ years Vaccine (1 - PCV) Never done    Depression Screen  Never done    HIV screen  Never done    Hepatitis C screen  Never done    DTaP/Tdap/Td vaccine (1 - Tdap) Never done    Diabetes screen  Never done    Colorectal Cancer Screen  Never done    Shingles vaccine (1 of 2) Never done    AAA screen  Never done    Flu vaccine (1) 09/01/2022    Lipids  08/30/2026    Hepatitis A vaccine  Aged Out    Hepatitis B vaccine  Aged Out    Hib vaccine  Aged Out    Meningococcal (ACWY) vaccine  Aged Out       Subjective:  Review of Systems  General:   No fever, no chills, some fatigue or weight loss  Pulmonary:    some dyspnea, no wheezing  Cardiac:    Denies recent chest pain,   GI:     No nausea or vomiting, no abdominal pain  Neuro:    No dizziness or light headedness,   Musculoskeletal:  No recent active issues  Extremities:   No edema, no obvious claudication     Objective:  Physical Exam  BP (!) 143/88   Pulse 91   Ht 5' 9\" (1.753 m)   Wt 193 lb (87.5 kg)   BMI 28.50 kg/m²   General:   Well developed, well nourished  Lungs:   Clear to auscultation  Heart:    Normal S1 S2, Slight murmur. no rubs, no gallops  Abdomen:   Soft, non tender, no organomegalies, positive bowel sounds  Extremities:   No edema, no cyanosis, good peripheral pulses  Neurological:   Awake, alert, oriented. No obvious focal deficits  Musculoskelatal:  No obvious deformities    Assessment:      Diagnosis Orders   1. Primary hypertension  EKG 12 lead      Smoking: discussed with the patient the importance of smoke cessation especially with the risk of CAD.   Risk for

## 2022-09-01 ENCOUNTER — NURSE ONLY (OUTPATIENT)
Dept: CARDIOLOGY CLINIC | Age: 67
End: 2022-09-01
Payer: MEDICARE

## 2022-09-01 DIAGNOSIS — Z95.0 PACEMAKER: Primary | ICD-10-CM

## 2022-09-01 PROCEDURE — 93280 PM DEVICE PROGR EVAL DUAL: CPT | Performed by: NUCLEAR MEDICINE

## 2022-09-01 NOTE — PROGRESS NOTES
In [address] [place] [address] Dual Pacemaker --has Merlin at home  Patient of [employer]    Battery 2.4-2.7 years    Presenting rhythm AS VS    A Impedance 310  RV Impedance 700    P wave sensing 3.1  R wave sensing >12    A Threshold 0.5 @ 0.40  RV Thresholds 1.25 @ 0.40      A Paced 22%  V Paced <1%    Programmed Mode DDD       Afib [place] 0%    Episodes   none

## 2022-12-21 ENCOUNTER — PROCEDURE VISIT (OUTPATIENT)
Dept: CARDIOLOGY CLINIC | Age: 67
End: 2022-12-21
Payer: MEDICARE

## 2022-12-21 DIAGNOSIS — Z95.0 PACEMAKER: Primary | ICD-10-CM

## 2022-12-21 PROCEDURE — 93294 REM INTERROG EVL PM/LDLS PM: CPT | Performed by: NUCLEAR MEDICINE

## 2022-12-21 PROCEDURE — 93296 REM INTERROG EVL PM/IDS: CPT | Performed by: NUCLEAR MEDICINE

## 2022-12-21 NOTE — PROGRESS NOTES
[facility] [employer] Dual Pacemaker -- Merlin at home  Patient of [facility]    Battery 2.1-2.4 years    Presenting rhythm AS VS    A Impedance 290  RV Impedance 690    P wave sensing 2.6  R wave sensing >12    A Threshold 0.50 @ 0.40  RV Thresholds 1.25 @ 0.40      A Paced 24%  V Paced <1%    Programmed Mode DDD       Afib [place] 0%    Episodes   none

## 2023-03-27 ENCOUNTER — PROCEDURE VISIT (OUTPATIENT)
Dept: CARDIOLOGY CLINIC | Age: 68
End: 2023-03-27
Payer: MEDICARE

## 2023-03-27 DIAGNOSIS — Z95.0 PACEMAKER: Primary | ICD-10-CM

## 2023-03-27 PROCEDURE — 93294 REM INTERROG EVL PM/LDLS PM: CPT | Performed by: INTERNAL MEDICINE

## 2023-03-27 PROCEDURE — 93296 REM INTERROG EVL PM/IDS: CPT | Performed by: INTERNAL MEDICINE

## 2023-03-27 NOTE — PROGRESS NOTES
Dr Caro Wright pt Merlin [facility] [place] dual pacer remote   Battery 1.8-2 yrs remaining    Ddd     A paced 23%  V paced <1%    P waves 2.3  Rv waves >12  Atrial impedence 280  Vent impedence 660    Atrial threshold 0.5 @ 0.4  Vent threshold 1.75 @ 0.4    Atrial and vent amplitudes auto

## 2023-06-30 ENCOUNTER — PROCEDURE VISIT (OUTPATIENT)
Dept: CARDIOLOGY CLINIC | Age: 68
End: 2023-06-30

## 2023-06-30 DIAGNOSIS — Z95.0 PACEMAKER: Primary | ICD-10-CM

## 2023-08-25 ENCOUNTER — OFFICE VISIT (OUTPATIENT)
Dept: CARDIOLOGY CLINIC | Age: 68
End: 2023-08-25

## 2023-08-25 ENCOUNTER — NURSE ONLY (OUTPATIENT)
Dept: CARDIOLOGY CLINIC | Age: 68
End: 2023-08-25
Payer: MEDICARE

## 2023-08-25 VITALS
BODY MASS INDEX: 28.38 KG/M2 | HEIGHT: 69 IN | SYSTOLIC BLOOD PRESSURE: 125 MMHG | DIASTOLIC BLOOD PRESSURE: 82 MMHG | WEIGHT: 191.6 LBS | HEART RATE: 67 BPM

## 2023-08-25 DIAGNOSIS — Z95.0 PACEMAKER: Primary | ICD-10-CM

## 2023-08-25 DIAGNOSIS — I49.9 IRREGULAR HEART BEAT: ICD-10-CM

## 2023-08-25 DIAGNOSIS — F17.200 SMOKING: ICD-10-CM

## 2023-08-25 PROCEDURE — 93280 PM DEVICE PROGR EVAL DUAL: CPT | Performed by: NUCLEAR MEDICINE

## 2023-08-25 NOTE — PROGRESS NOTES
[employer] dual pacer in office     . [facility] Battery longevity:  1.4 years on device, will watch battery over the next year with merlin  Presenting rhythm  AS VS     Atrial impedance 310  RV impedance 700    P wave sensing 2.9  R wave sensing 12    28 % atrial paced  1 % RV paced     Atrial threshold 0.625 V  at 0.4ms  RV threshold 1.375 V at 0.4ms  Mode switches   0

## 2023-08-25 NOTE — PROGRESS NOTES
[address] [address]  Dept: [phone]  Dept Fax: [phone]  Loc: [phone]    Visit Date: 8/25/2023    Manuela Saleem is a 79 y.o. male who presents todayfor:  Chief Complaint   Patient presents with    Check-Up     Know pacer  Still smoking  No chest pain   No changes in breathing  BP is stable  Some dizziness  No syncope      HPI:  HPI  Past Medical History:   Diagnosis Date    Dizziness - light-headed     Dyslipidemia     Fainting     Family history of early CAD 3/20/2013    Hypertension 10/2/2013    Irregular heart beat     Pacemaker     [employer] dual pacer    Pacemaker battery depletion: 12/2/2014 generator replacement along with insertion of a new atrial lead 12/21/2014    Pacemaker lead malfunction: Some noise on [place]ec[place] atrial lead causing mode switch. 10/2/2013    [employer] dual pacemaker 6/3/2016    Tobacco abuse counseling 10/2/2013    Vasovagal syncope       Past Surgical History:   Procedure Laterality Date    CARDIOVASCULAR STRESS TEST  3-23-11    Patient had adequate exercise stress test by heart rate criteria and showed good functional capacity. Absence of chest pain or SVT changes suggestive of ischemia at this workload. Absence of exercise induced arrhythmias. EF 50%. TRANSTHORACIC ECHOCARDIOGRAM  3-23-11    LV size and systolic function normal. EF 50%. Grade 1 diastolic dysfunction. AV trileaflet. Leaflets exhibited normal thickness and normal cuspal seperation. Family History   Problem Relation Age of Onset    Coronary Art Dis Mother     Cancer Father      Social History     Tobacco Use    Smoking status: Every Day     Packs/day: 0.50     Years: 35.00     Pack years: 17.50     Types: Cigarettes    Smokeless tobacco: Never   Substance Use Topics    Alcohol use:  Yes     Alcohol/week: 6.0 standard drinks     Types: 6 Cans of beer per week      Current Outpatient Medications   Medication Sig

## 2023-11-29 ENCOUNTER — TELEPHONE (OUTPATIENT)
Dept: CARDIOLOGY CLINIC | Age: 68
End: 2023-11-29

## 2023-11-29 ENCOUNTER — PROCEDURE VISIT (OUTPATIENT)
Dept: CARDIOLOGY CLINIC | Age: 68
End: 2023-11-29
Payer: MEDICARE

## 2023-11-29 DIAGNOSIS — Z95.0 PACEMAKER: Primary | ICD-10-CM

## 2023-11-29 PROCEDURE — 93296 REM INTERROG EVL PM/IDS: CPT | Performed by: NUCLEAR MEDICINE

## 2023-11-29 PROCEDURE — 93294 REM INTERROG EVL PM/LDLS PM: CPT | Performed by: NUCLEAR MEDICINE

## 2023-11-29 NOTE — TELEPHONE ENCOUNTER
I RECEIVED A MERLIN DOWNLOAD ON THIS PT TODAY, HOWEVER THE DEVICE DID NOT RECORD ANY MEASUREMENTS SO I ASKED PT IF HE COULD PLEASE SEND AGAIN. Tariq Kinney SAID HE WILL TRY TO SEND TOMORROW. IT WILL NEED PROCESSED AND CHARGED.  I PUT THE ONE IN FROM YESTERDAY BUT DID NOT CHARGE

## 2023-11-29 NOTE — PROGRESS NOTES
Dr Keysha Forman pt   Merlin [facility] dual pacer remote   Battery 1.4 remaining      Ddd     A paced 67%   V paced 2%     The device did not obtain any measurements // I CALLED THIS PT AND ASKED HIM TO RESEND BECAUSE HIS DEVICE DID NOT RECORD    N/C

## 2023-11-30 NOTE — PROGRESS NOTES
Remote [employer] Dual Pacemaker   Patient of [facility]    Battery 1.4 years    Presenting rhythm ASVS    A Impedance 280  RV Impedance 690    P wave sensing 2.7  R wave sensing >12    A Threshold 0.625 @ 0.40  RV Thresholds 1.375 @ 0.40      A Paced 17%  V Paced <1%    Programmed Mode DDD       Afib [place] 0%    Episodes   none

## 2024-03-07 ENCOUNTER — PROCEDURE VISIT (OUTPATIENT)
Dept: CARDIOLOGY CLINIC | Age: 69
End: 2024-03-07

## 2024-03-07 DIAGNOSIS — Z95.0 PACEMAKER: Primary | ICD-10-CM

## 2024-03-07 NOTE — PROGRESS NOTES
Merlin [facility] dual pacer     ..Battery longevity:  1.3 years   Presenting rhythm  AS VS     Atrial impedance 280  RV impedance 660    P wave sensing 2.5  R wave sensing 12    25 % atrial paced  1 % RV paced     Atrial threshold 0.5 V  at 0.4ms  RV threshold 1.875 V at 0.4ms  Mode switches   0

## 2024-06-13 ENCOUNTER — PROCEDURE VISIT (OUTPATIENT)
Dept: CARDIOLOGY CLINIC | Age: 69
End: 2024-06-13

## 2024-06-13 DIAGNOSIS — Z95.0 PACEMAKER: Primary | ICD-10-CM

## 2024-06-13 NOTE — PROGRESS NOTES
Dr yost pt  Merlin [facility] dual pacer remote  Battery 1.2 yrs remaining    Ddd     A paced 24%  V paced <1%    P waves 3.6  Rv waves >12    Atrial impedence 290  Vent impedence 660    Atrial threshold 0.5 @ 0.4  Vent threshold 1.25 @ 0.4    Atrial and vent amplitude auto         Mode switch 0

## 2024-08-23 ENCOUNTER — OFFICE VISIT (OUTPATIENT)
Dept: CARDIOLOGY CLINIC | Age: 69
End: 2024-08-23

## 2024-08-23 VITALS
SYSTOLIC BLOOD PRESSURE: 134 MMHG | WEIGHT: 189.8 LBS | RESPIRATION RATE: 18 BRPM | BODY MASS INDEX: 28.11 KG/M2 | HEART RATE: 92 BPM | HEIGHT: 69 IN | DIASTOLIC BLOOD PRESSURE: 83 MMHG

## 2024-08-23 DIAGNOSIS — Z95.0 PACEMAKER: Primary | ICD-10-CM

## 2024-08-23 NOTE — PROGRESS NOTES
[facility] [place] [facility]  [facility] CARDIOLOGY  [address]  [facility] [address]  Dept: [phone]  Dept Fax: [phone]  Loc: [phone]    Visit Date: 8/23/2024    Isaac Agustin is a 69 y.o. male who presents todayfor:  Chief Complaint   Patient presents with    Follow-up   Know pacer   No chest pain  Some baseline dyspnea  Still smoking  No new symptoms  BP is stable  some dizziness  No syncope        HPI:  HPI  Past Medical History:   Diagnosis Date    Dizziness - light-headed     Dyslipidemia     Fainting     Family history of early CAD 3/20/2013    Hypertension 10/2/2013    Irregular heart beat     Pacemaker     [employer] dual pacer    Pacemaker battery depletion: 12/2/2014 generator replacement along with insertion of a new atrial lead 12/21/2014    Pacemaker lead malfunction: Some noise on cecille atrial lead causing mode switch. 10/2/2013    [employer] dual pacemaker 6/3/2016    Tobacco abuse counseling 10/2/2013    Vasovagal syncope       Past Surgical History:   Procedure Laterality Date    CARDIOVASCULAR STRESS TEST  3-23-11    Patient had adequate exercise stress test by heart rate criteria and showed good functional capacity. Absence of chest pain or SVT changes suggestive of ischemia at this workload. Absence of exercise induced arrhythmias. EF 50%.     TRANSTHORACIC ECHOCARDIOGRAM  3-23-11    LV size and systolic function normal. EF 50%. Grade 1 diastolic dysfunction. AV trileaflet. Leaflets exhibited normal thickness and normal cuspal seperation.      Family History   Problem Relation Age of Onset    Coronary Art Dis Mother     Cancer Father      Social History     Tobacco Use    Smoking status: Every Day     Current packs/day: 0.50     Average packs/day: 0.5 packs/day for 35.0 years (17.5 ttl pk-yrs)     Types: Cigarettes    Smokeless tobacco: Never   Substance Use Topics    Alcohol use: Yes     Alcohol/week: 6.0 standard drinks of alcohol     Types: 6 Cans of

## 2025-08-29 ENCOUNTER — OFFICE VISIT (OUTPATIENT)
Dept: CARDIOLOGY CLINIC | Age: 70
End: 2025-08-29
Payer: MEDICARE

## 2025-08-29 VITALS
WEIGHT: 188.8 LBS | HEIGHT: 69 IN | BODY MASS INDEX: 27.96 KG/M2 | HEART RATE: 86 BPM | SYSTOLIC BLOOD PRESSURE: 147 MMHG | DIASTOLIC BLOOD PRESSURE: 89 MMHG

## 2025-08-29 DIAGNOSIS — E78.01 FAMILIAL HYPERCHOLESTEROLEMIA: ICD-10-CM

## 2025-08-29 DIAGNOSIS — F17.200 SMOKING ADDICTION: ICD-10-CM

## 2025-08-29 DIAGNOSIS — Z95.0 PACEMAKER: Primary | ICD-10-CM

## 2025-08-29 PROCEDURE — 3077F SYST BP >= 140 MM HG: CPT | Performed by: NUCLEAR MEDICINE

## 2025-08-29 PROCEDURE — G8427 DOCREV CUR MEDS BY ELIG CLIN: HCPCS | Performed by: NUCLEAR MEDICINE

## 2025-08-29 PROCEDURE — 93000 ELECTROCARDIOGRAM COMPLETE: CPT | Performed by: NUCLEAR MEDICINE

## 2025-08-29 PROCEDURE — G8419 CALC BMI OUT NRM PARAM NOF/U: HCPCS | Performed by: NUCLEAR MEDICINE

## 2025-08-29 PROCEDURE — 1159F MED LIST DOCD IN RCRD: CPT | Performed by: NUCLEAR MEDICINE

## 2025-08-29 PROCEDURE — 4004F PT TOBACCO SCREEN RCVD TLK: CPT | Performed by: NUCLEAR MEDICINE

## 2025-08-29 PROCEDURE — 1123F ACP DISCUSS/DSCN MKR DOCD: CPT | Performed by: NUCLEAR MEDICINE

## 2025-08-29 PROCEDURE — 3079F DIAST BP 80-89 MM HG: CPT | Performed by: NUCLEAR MEDICINE

## 2025-08-29 PROCEDURE — 3017F COLORECTAL CA SCREEN DOC REV: CPT | Performed by: NUCLEAR MEDICINE

## 2025-08-29 PROCEDURE — 99214 OFFICE O/P EST MOD 30 MIN: CPT | Performed by: NUCLEAR MEDICINE
